# Patient Record
Sex: MALE | Race: WHITE | NOT HISPANIC OR LATINO | Employment: FULL TIME | URBAN - METROPOLITAN AREA
[De-identification: names, ages, dates, MRNs, and addresses within clinical notes are randomized per-mention and may not be internally consistent; named-entity substitution may affect disease eponyms.]

---

## 2022-09-04 ENCOUNTER — HOSPITAL ENCOUNTER (EMERGENCY)
Facility: HOSPITAL | Age: 37
Discharge: HOME | End: 2022-09-05
Attending: EMERGENCY MEDICINE | Admitting: EMERGENCY MEDICINE

## 2022-09-04 ENCOUNTER — APPOINTMENT (OUTPATIENT)
Dept: GENERAL RADIOLOGY | Facility: HOSPITAL | Age: 37
End: 2022-09-04

## 2022-09-04 VITALS
TEMPERATURE: 99.1 F | OXYGEN SATURATION: 97 % | HEART RATE: 109 BPM | DIASTOLIC BLOOD PRESSURE: 103 MMHG | RESPIRATION RATE: 18 BRPM | SYSTOLIC BLOOD PRESSURE: 170 MMHG

## 2022-09-04 DIAGNOSIS — S61.227A LACERATION OF LEFT LITTLE FINGER WITH FOREIGN BODY WITHOUT DAMAGE TO NAIL, INITIAL ENCOUNTER: Primary | ICD-10-CM

## 2022-09-04 PROCEDURE — 90471 IMMUNIZATION ADMIN: CPT | Performed by: EMERGENCY MEDICINE

## 2022-09-04 PROCEDURE — 73130 X-RAY EXAM OF HAND: CPT

## 2022-09-04 PROCEDURE — 90715 TDAP VACCINE 7 YRS/> IM: CPT | Performed by: EMERGENCY MEDICINE

## 2022-09-04 PROCEDURE — 99283 EMERGENCY DEPT VISIT LOW MDM: CPT

## 2022-09-04 PROCEDURE — 25010000002 TETANUS-DIPHTH-ACELL PERTUSSIS 5-2.5-18.5 LF-MCG/0.5 SUSPENSION PREFILLED SYRINGE: Performed by: EMERGENCY MEDICINE

## 2022-09-04 RX ADMIN — TETANUS TOXOID, REDUCED DIPHTHERIA TOXOID AND ACELLULAR PERTUSSIS VACCINE, ADSORBED 0.5 ML: 5; 2.5; 8; 8; 2.5 SUSPENSION INTRAMUSCULAR at 23:13

## 2022-09-05 ENCOUNTER — APPOINTMENT (OUTPATIENT)
Dept: GENERAL RADIOLOGY | Facility: HOSPITAL | Age: 37
End: 2022-09-05

## 2022-09-05 PROCEDURE — 73130 X-RAY EXAM OF HAND: CPT

## 2022-09-05 PROCEDURE — 73140 X-RAY EXAM OF FINGER(S): CPT

## 2022-09-05 RX ORDER — CEPHALEXIN 500 MG/1
500 CAPSULE ORAL 4 TIMES DAILY
Qty: 28 CAPSULE | Refills: 0 | Status: SHIPPED | OUTPATIENT
Start: 2022-09-05

## 2022-09-05 NOTE — ED PROVIDER NOTES
Subjective   This is a 37 year old male with the chief complaint of laceration to the left 5th finger, volar side.  He was opening an old garage door, and a metal piece broke off and lacerated his finger as he was trying to move it.  He had injured this same finger before and states this does not hurt as much.  He can feel and move it.  He denies any other precipitating, aggravating, or alleviating factors than those listed above.          Review of Systems   Skin: Positive for wound. Negative for pallor.   All other systems reviewed and are negative.      Past Medical History:   Diagnosis Date   • ADHD (attention deficit hyperactivity disorder)    • Hypertension due to Adderal        No Known Allergies    Past Surgical History:   Procedure Laterality Date   • FINGER SURGERY Left     Left 5th finger laceration X2       History reviewed. No pertinent family history.    Social History     Socioeconomic History   • Marital status:    Tobacco Use   • Smoking status: Never Smoker   • Smokeless tobacco: Never Used   Vaping Use   • Vaping Use: Never used   Substance and Sexual Activity   • Alcohol use: Yes     Comment: occasionally   • Drug use: Defer   • Sexual activity: Defer           Objective   Physical Exam  Vitals and nursing note reviewed.   Constitutional:       Appearance: Normal appearance. He is normal weight.   HENT:      Head: Normocephalic and atraumatic.   Musculoskeletal:         General: Normal range of motion.        Hands:       Comments: Approximately 1cmX0.5cm jagged laceration as marked above, into subcutaneous fat but no deeper.  No deep structures visible with examination through full ROM.  No active bleeding at this time.  There are small jagged, devascularized and already discolored patches of skin hanging from the distal end of the wound.  No foreign bodies noted.   Skin:     General: Skin is warm and dry.      Capillary Refill: Capillary refill takes less than 2 seconds.   Neurological:       General: No focal deficit present.      Mental Status: He is alert and oriented to person, place, and time. Mental status is at baseline.   Psychiatric:         Mood and Affect: Mood normal.         Behavior: Behavior normal.         Thought Content: Thought content normal.         Judgment: Judgment normal.         Wound Care    Date/Time: 9/5/2022 1:35 AM  Performed by: Bettina Banks MD  Authorized by: Bettina Banks MD     Consent:     Consent obtained:  Verbal    Consent given by:  Patient    Risks, benefits, and alternatives were discussed: yes      Risks discussed:  Bleeding, infection, nerve damage, poor cosmetic result and pain    Alternatives discussed:  Delayed treatment, alternative treatment, no treatment, observation and referral  Universal protocol:     Patient identity confirmed:  Verbally with patient  Pre-procedure details:     Preparation: Patient was prepped and draped in usual sterile fashion    Sedation:     Sedation type:  None  Anesthesia:     Anesthesia method:  Local infiltration    Local anesthetic:  Lidocaine 1% WITH epi  Procedure details:     Indications: open wounds      Wound location:  Finger    Finger location:  L small finger    Wound age (days):  <1    Wound surface area (sq cm):  2    Debridement performed: Yes      Debridement type: excisional      Debridement level: skin, partial thickness      Debridement mechanism:  Scissors    Devitalized tissue debrided comment:  2 small partial-thickness skin flaps were excised due to complete devascularization.  Dressing:     Dressing applied:  4x4    Wrapped with:  Bulky dressing  Post-procedure details:     Procedure completion:  Tolerated  Comments:      The wound was closed by primary intention.  Bacitracin was placed as a dressing.  Patient is being placed in a finger splint to protect the wound.               ED Course  ED Course as of 09/05/22 0143   Sun Sep 04, 2022   2336 XR Hand 3+ View Left [LC]   Mon Sep 05,  2022   0022 The patient irrigated and scubbed the laceration after the Xray was performed.  I have since numbed the finger with 1% lidocaine and fully explored it.  I can not find any visible foreign bodies.  I will re-Xray the hand given the original presence of foreign bodies to be sure we have removed all of them, and then attempt to suture portions of the laceration.  The middle portion had flaps of devascularized skin that required removal, and I am not sure there is adequate tissue to cover the laceration without causing a finger deformity. [LC]   0132 I had an extensive discussion with the patient after the third x-ray demonstrating the continued presence of small foreign bodies.  The patient has scrubbed his hand again, and let significant amounts of water run through the area.  I have done a thorough explanation yet again, but I am completely unable to find or remove any of the continued foreign bodies.  Given this, and the nature of the wound being that of having significant tissue deficit, the patient shows not to have any laceration repair performed.  There was only a small medial area in which laceration repair could have been performed, and the patient elected to keep this open as well and have the laceration closed by primary intention.  He understood the risks and benefits of all of these, including the possibility of infection, large scar, limitation of movement, or further tissue damage.  I have encouraged the patient to follow-up closely with the hand center in a day or 2, and to return if he has any further problems.  The patient verbalized his understanding of this.  Given the dirty nature of the wound, I plan to start the patient on antibiotics. [LC]      ED Course User Index  [LC] Bettina Banks MD                                           MDM  Number of Diagnoses or Management Options     Amount and/or Complexity of Data Reviewed  Tests in the radiology section of CPT®: ordered and  reviewed    Risk of Complications, Morbidity, and/or Mortality  Presenting problems: moderate  Diagnostic procedures: low  Management options: high    Patient Progress  Patient progress: stable      Final diagnoses:   Laceration of left little finger with foreign body without damage to nail, initial encounter       ED Disposition  ED Disposition     ED Disposition   Discharge    Condition   Stable    Comment   --             Jony Lilly MD  [address] [id number]  [phone]    Call in 2 days           Medication List      New Prescriptions    cephalexin 500 MG capsule  Commonly known as: KEFLEX  Take 1 capsule by mouth 4 (Four) Times a Day.           Where to Get Your Medications      These medications were sent to [id number] - [place] - [address] - [phone]  - [phone]   [address] [facility] [place] [address]    Phone: [phone]   · cephalexin 500 MG capsule          Bettina Banks MD  09/05/22 0143